# Patient Record
(demographics unavailable — no encounter records)

---

## 2025-01-10 NOTE — PHYSICAL EXAM
[Appropriately responsive] : appropriately responsive [Alert] : alert [No Acute Distress] : no acute distress [Oriented x3] : oriented x3 [Soft] : soft [Non-tender] : non-tender [Non-distended] : non-distended [No HSM] : No HSM [No Lesions] : no lesions [No Mass] : no mass [Examination Of The Breasts] : a normal appearance [No Masses] : no breast masses were palpable [Labia Majora] : normal [Labia Minora] : normal [Normal] : normal [Uterine Adnexae] : normal

## 2025-01-10 NOTE — HISTORY OF PRESENT ILLNESS
[FreeTextEntry1] :  29 year old  female presents for confirmation of pregnancy. Had  in 2024 - pregnancy complicated by polyhydramnios and PP preeclampsia. Had annual in 2024 and had negative pap. Reports still breastfeeding. Notes period has been irregular PP. Had periods in August, October, and . C/o nausea. [PapSmeardate] : 6/2024

## 2025-01-10 NOTE — PLAN
[FreeTextEntry1] :  29 year old  female @ 9 weeks by LMP c/w CRL (8+4) today, presents for confirmation of pregnancy.  -NOB panel -Prior  -Hx PP PEC -Hx polyhydramnios  RTO in 3 weeks for NPN, NT sono, and NIPS

## 2025-01-10 NOTE — END OF VISIT
[FreeTextEntry3] :  I, Josette Mooney, acted as a scribe on behalf of Dr. Jade Guthrie M.D. on 01/10/2025.   All medical entries made by the scribe were at my, Dr. Jade Guthrie M.D., direction and personally dictated by me on 01/10/2025. I have reviewed the chart and agree that the record accurately reflects my personal performance of the history, physical exam, assessment and plan. I have also personally directed, reviewed, and agreed with the chart.